# Patient Record
(demographics unavailable — no encounter records)

---

## 2024-11-06 NOTE — HISTORY OF PRESENT ILLNESS
[de-identified] : Right Shoulder Surgery [de-identified] : 26 y/o male s/p Right shoulder hemiarthroplasty, 09/12/2024. Overall doing well pain is controlled he is wearing his sling he started physical therapy he denies numbness and tingling  [de-identified] : right shoulder exam. inc healed well. no erythema no pain gentle passive rom able to flex/ext all fingers silt r/u/m/ax bcr [de-identified] : The following radiographs were ordered and read by me during this patients visit. I reviewed each radiograph in detail with the patient and discussed the findings as highlighted below.  3 views right shoulder obtained today status post mid hemiarthroplasty in good position no evidence of fracture [de-identified] : Right shoulder hemiarthroplasty, 09/12/2024.   [de-identified] : Post operative protocol and restrictions discussed. Physical therapy, home exercises and stretches, continue current pain regimen as needed.  Discontinue sling all questions answered. FU in __4 - 6 weeks____

## 2024-12-06 NOTE — HISTORY OF PRESENT ILLNESS
[de-identified] : Right Shoulder [de-identified] : 28 yo male s/p Right shoulder hemiarthroplasty, 09/12/2024.  He is overall doing well reports improvement in his pain as well as his motion is working physical therapy [de-identified] : right shoulder exam. inc healed well. no erythema no pain gentle passive rom Active forward elevation 90 degrees passively to 130 degrees 4-5 abduction internal rotation able to flex/ext all fingers silt r/u/m/ax bcr [de-identified] : The following radiographs were ordered and read by me during this patients visit. I reviewed each radiograph in detail with the patient and discussed the findings as highlighted below. 3 views right shoulder were obtained today.  Status post cemented hemiarthroplasty unchanged glenohumeral joint is reduced [de-identified] : Right shoulder hemiarthroplasty, 09/12/2024. [de-identified] : Post operative protocol and restrictions discussed. Physical therapy, home exercises and stretches, continue current pain regimen as needed. All questions answered. FU in ___2 months___

## 2025-02-05 NOTE — PHYSICAL EXAM
[de-identified] : Right shoulder exam  Inspection: No swelling, ecchymosis or gross deformity. Skin: No masses, No lesions Tenderness: No bicipital tenderness, no tenderness to the greater tuberosity/RTC insertion, no anterior shoulder/lesser tuberosity tenderness. No tenderness SC joint, clavicle, AC joint. ROM: 145/50/T10 Impingement tests: neg Lorenz AC Joint: no pain with cross arm testing Biceps: Negative speed Strength: 5/5 abduction, external rotation, and internal rotation Neuro: AIN, PIN, Ulnar nerve motor intact Sensation: Intact to light touch in radial, median, ulnar, and axillary nerve distributions Vasc: 2+ radial pulse [de-identified] : The following radiographs were ordered and read by me during this patients visit. I reviewed each radiograph in detail with the patient and discussed the findings as highlighted below.  3 views right shoulder were obtained today. Status post cemented hemiarthroplasty unchanged glenohumeral joint is reduced.

## 2025-02-05 NOTE — DISCUSSION/SUMMARY
[de-identified] : He is overall doing well at this time he will continue his therapy and exercise program focus on range of motion and strengthening he will follow-up in 2 months.  All questions were answered he expressed agreement with the plan

## 2025-06-06 NOTE — PHYSICAL EXAM
[de-identified] : Right shoulder exam  Inspection: No swelling, ecchymosis or gross deformity. Skin: No masses, No lesions Tenderness: No bicipital tenderness, no tenderness to the greater tuberosity/RTC insertion, no anterior shoulder/lesser tuberosity tenderness. No tenderness SC joint, clavicle, AC joint. ROM: 145/50/T10 Impingement tests: neg Lorenz AC Joint: no pain with cross arm testing Biceps: Negative speed Strength: 5/5 abduction, external rotation, and internal rotation Neuro: AIN, PIN, Ulnar nerve motor intact Sensation: Intact to light touch in radial, median, ulnar, and axillary nerve distributions Vasc: 2+ radial pulse [de-identified] :  The following radiographs were ordered and read by me during this patients visit. I reviewed each radiograph in detail with the patient and discussed the findings as highlighted below.   3 views right shoulder obtained today there is a carbon fiber prosthesis in place the glenohumeral joint is reduced

## 2025-06-06 NOTE — PHYSICAL EXAM
[de-identified] : Right shoulder exam  Inspection: No swelling, ecchymosis or gross deformity. Skin: No masses, No lesions Tenderness: No bicipital tenderness, no tenderness to the greater tuberosity/RTC insertion, no anterior shoulder/lesser tuberosity tenderness. No tenderness SC joint, clavicle, AC joint. ROM: 145/50/T10 Impingement tests: neg Lorenz AC Joint: no pain with cross arm testing Biceps: Negative speed Strength: 5/5 abduction, external rotation, and internal rotation Neuro: AIN, PIN, Ulnar nerve motor intact Sensation: Intact to light touch in radial, median, ulnar, and axillary nerve distributions Vasc: 2+ radial pulse [de-identified] :  The following radiographs were ordered and read by me during this patients visit. I reviewed each radiograph in detail with the patient and discussed the findings as highlighted below.   3 views right shoulder obtained today there is a carbon fiber prosthesis in place the glenohumeral joint is reduced

## 2025-06-06 NOTE — HISTORY OF PRESENT ILLNESS
[de-identified] : 28 yo male s/p Right shoulder hemiarthroplasty, 09/12/2024. Overall doing well. He is happy with his progress so far. He reports some mild stiffness in the shoulder but improving he is finished with physical therapy doing exercises on his own at the gym and is happy with his progress

## 2025-06-06 NOTE — DISCUSSION/SUMMARY
[de-identified] : 9-month status post shoulder knee arthroplasty doing well he does have some stiffness which is improving he continues physical therapy and exercise program activities as tolerated recommend follow-up in 6 months or year for annual radiographic checkup.  All questions were answered

## 2025-06-06 NOTE — DISCUSSION/SUMMARY
[de-identified] : 9-month status post shoulder knee arthroplasty doing well he does have some stiffness which is improving he continues physical therapy and exercise program activities as tolerated recommend follow-up in 6 months or year for annual radiographic checkup.  All questions were answered

## 2025-06-06 NOTE — HISTORY OF PRESENT ILLNESS
[de-identified] : 28 yo male s/p Right shoulder hemiarthroplasty, 09/12/2024. Overall doing well. He is happy with his progress so far. He reports some mild stiffness in the shoulder but improving he is finished with physical therapy doing exercises on his own at the gym and is happy with his progress

## 2025-06-06 NOTE — HISTORY OF PRESENT ILLNESS
[de-identified] : 28 yo male s/p Right shoulder hemiarthroplasty, 09/12/2024. Overall doing well. He is happy with his progress so far. He reports some mild stiffness in the shoulder but improving he is finished with physical therapy doing exercises on his own at the gym and is happy with his progress

## 2025-06-06 NOTE — PHYSICAL EXAM
[de-identified] : Right shoulder exam  Inspection: No swelling, ecchymosis or gross deformity. Skin: No masses, No lesions Tenderness: No bicipital tenderness, no tenderness to the greater tuberosity/RTC insertion, no anterior shoulder/lesser tuberosity tenderness. No tenderness SC joint, clavicle, AC joint. ROM: 145/50/T10 Impingement tests: neg Lorenz AC Joint: no pain with cross arm testing Biceps: Negative speed Strength: 5/5 abduction, external rotation, and internal rotation Neuro: AIN, PIN, Ulnar nerve motor intact Sensation: Intact to light touch in radial, median, ulnar, and axillary nerve distributions Vasc: 2+ radial pulse [de-identified] :  The following radiographs were ordered and read by me during this patients visit. I reviewed each radiograph in detail with the patient and discussed the findings as highlighted below.   3 views right shoulder obtained today there is a carbon fiber prosthesis in place the glenohumeral joint is reduced

## 2025-06-06 NOTE — DISCUSSION/SUMMARY
[de-identified] : 9-month status post shoulder knee arthroplasty doing well he does have some stiffness which is improving he continues physical therapy and exercise program activities as tolerated recommend follow-up in 6 months or year for annual radiographic checkup.  All questions were answered